# Patient Record
Sex: FEMALE | Employment: STUDENT | URBAN - METROPOLITAN AREA
[De-identification: names, ages, dates, MRNs, and addresses within clinical notes are randomized per-mention and may not be internally consistent; named-entity substitution may affect disease eponyms.]

---

## 2020-05-02 ENCOUNTER — VIRTUAL VISIT (OUTPATIENT)
Dept: FAMILY MEDICINE | Facility: OTHER | Age: 20
End: 2020-05-02

## 2020-05-03 NOTE — PROGRESS NOTES
"Date: 2020 14:45:00  Clinician: Maria Del Carmen Carter  Clinician NPI: [id number]  Patient: Jeremy Lucero  Patient : 2000  Patient Address: [address] [place] [id number]  Patient Phone: [phone]  Visit Protocol: URI  Patient Summary:  Jeremy is a 20 year old ( : 2000 ) female who initiated a Visit for COVID-19 (Coronavirus) evaluation and screening. When asked the question \"Please sign me up to receive news, health information and promotions from [facility].\", Jeremy responded \"No\".    Jeremy states her symptoms started gradually 3-4 days ago. After her symptoms started, they improved and then got worse again.   Her symptoms consist of myalgia, a sore throat, a cough, a headache, and malaise. She is experiencing mild difficulty breathing with activities but can speak normally in full sentences.   Symptom details     Cough: Jeremy coughs a few times an hour and her cough is more bothersome at night. Phlegm does not come into her throat when she coughs. She does not believe her cough is caused by post-nasal drip.     Sore throat: Jeremy reports having mild throat pain (1-3 on a 10 point pain scale), does not have exudate on her tonsils, and can swallow liquids. She is not sure if the lymph nodes in her neck are enlarged. A rash has not appeared on the skin since the sore throat started.     Headache: She states the headache is mild (1-3 on a 10 point pain scale).      Jeremy denies having fever, rhinitis, facial pain or pressure, nasal congestion, vomiting, nausea, teeth pain, ageusia, anosmia, diarrhea, ear pain, wheezing, and chills. She also denies taking antibiotic medication for the symptoms, having a sinus infection within the past year, and having recent facial or sinus surgery in the past 60 days.   Precipitating events  Within the past week, Jeremy has been exposed to someone with strep throat. She has not recently been exposed to someone with influenza. Jeremy has been in close contact with the " following high risk individuals: people with asthma, heart disease or diabetes and immunocompromised people.   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   Jeremy has not had a close contact with a laboratory-confirmed COVID-19 patient within 14 days of symptom onset. She also has not had a close contact with a suspected COVID-19 patient within 14 days of symptom onset.   Pertinent medical history  Jeremy does not get yeast infections when she takes antibiotics.   Jeremy does not need a return to work/school note.   Weight: 110 lbs   Jeremy does not smoke or use smokeless tobacco.   She denies pregnancy and denies breastfeeding. She has menstruated in the past month.   Weight: 110 lbs    MEDICATIONS: hydroxyzine HCl oral, venlafaxine oral, ALLERGIES: NKDA  Clinician Response:  Dear Jeremy,      Your symptoms show that you may have coronavirus (COVID-19). This illness can cause fever, cough and trouble breathing. Many people get a mild case and get better on their own. Some people can get very sick.   Will I be tested for COVID-19?  We would recommend you be tested for coronavirus. Here is how to get that scheduled:   Call [phone]. Tell them you were referred by [facility] to have a COVID-19 test. You will be scheduled at one of our [facility] testing locations (drive-up). Please have your [facility] visit information ready when you call including your visit ID number to verify you were referred.    How can I protect others in the meantime?  First, stay home and away from others (self-isolate) until:   You've had no fever---and no medicine that reduces fever---for 3 full days (72 hours). And...    Your other  symptoms have gotten better. For example, your cough or breathing has improved. And...    At least 7 days have passed since your symptoms started.   During this time:   Don't go to work, school or anywhere else.     Stay away from others in your home. No hugging, kissing or shaking hands.    Don't let anyone visit.    Cover your mouth and nose with a mask, tissue or wash cloth to avoid spreading germs.    Wash your hands and face often. Use soap and water.   How can I take care of myself?  1.Take Tylenol (acetaminophen) for fever or pain. If you have liver or kidney problems, ask your family doctor if it's okay to take Tylenol.   Adults can take either:    650 mg (two 325 mg pills) every 4 to 6 hours, or...    1,000 mg (two 500 mg pills) every 8 hours as needed.     Note: Don't take more than 3,000 mg in one day.   For children, check the Tylenol bottle for the right dose. The dose is based on the child's age or weight.  2.If you have other health problems (like cancer, heart failure, an organ transplant or severe kidney disease): Call your specialty clinic if you don't feel better in the next 2 days.  3.Know when to call 911: If your breathing is so bad that it keeps you from doing normal activities, call 911 or go to the emergency room. Tell them that you've been staying home and may have COVID-19.  4.Sign up for [employer]. We know it's scary to hear that you might have COVID-19. We want to track your symptoms to make sure you're okay over the next 2 weeks. Please look for an email from [employer]---this is a free, online program that we'll use to keep in touch. To sign up, follow the link in the email. Learn more at [email or web address].  Where can I get more information?  To learn more about COVID-19 and how to care for yourself at home, please visit the CDC website at https://www.cdc.gov/coronavirus/2019-ncov/about/steps-when-sick.html.  For more about your care at [facility], please  visit [email or web address]    Diagnosis: Cough  Diagnosis ICD: R05

## 2020-05-06 ENCOUNTER — OFFICE VISIT (OUTPATIENT)
Dept: URGENT CARE | Facility: URGENT CARE | Age: 20
End: 2020-05-06
Payer: COMMERCIAL

## 2020-05-06 DIAGNOSIS — Z53.9 ERRONEOUS ENCOUNTER--DISREGARD: ICD-10-CM

## 2020-05-06 DIAGNOSIS — Z20.822 SUSPECTED 2019 NOVEL CORONAVIRUS INFECTION: Primary | ICD-10-CM

## 2020-05-06 NOTE — PROGRESS NOTES
Patient given covid 19 education handout.  Laurel Antonio MA    This encounter was opened in error. Please disregard.

## 2021-03-04 ENCOUNTER — TRANSFERRED RECORDS (OUTPATIENT)
Dept: HEALTH INFORMATION MANAGEMENT | Facility: CLINIC | Age: 21
End: 2021-03-04

## 2021-03-07 ENCOUNTER — HEALTH MAINTENANCE LETTER (OUTPATIENT)
Age: 21
End: 2021-03-07

## 2021-03-10 ENCOUNTER — TRANSFERRED RECORDS (OUTPATIENT)
Dept: HEALTH INFORMATION MANAGEMENT | Facility: CLINIC | Age: 21
End: 2021-03-10

## 2021-03-15 ENCOUNTER — MEDICAL CORRESPONDENCE (OUTPATIENT)
Dept: HEALTH INFORMATION MANAGEMENT | Facility: CLINIC | Age: 21
End: 2021-03-15

## 2021-03-16 ENCOUNTER — TRANSCRIBE ORDERS (OUTPATIENT)
Dept: OTHER | Age: 21
End: 2021-03-16

## 2021-03-16 DIAGNOSIS — R19.7 DIARRHEA: Primary | ICD-10-CM

## 2021-04-06 NOTE — TELEPHONE ENCOUNTER
REFERRAL INFORMATION:    Referring Provider:  Dr Ofelia Espinal    Referring Clinic:  [facility]    Reason for Visit/Diagnosis: Video-mychart // IBS with worsening abdominal cramping, bloating and diarrhea      FUTURE VISIT INFORMATION:    Appointment Date: 5.5.21    Appointment Time: 1:40 PM     NOTES STATUS DETAILS   OFFICE NOTE from Referring Provider Received 3.4.21 Dr Espinal [place]   [facility]   HOSPITAL DISCHARGE SUMMARY/  ED VISITS N/A    OPERATIVE REPORT N/A    MEDICATION LIST Care Everywhere         ENDOSCOPY  N/A    COLONOSCOPY N/A    ERCP N/A    EUS N/A    STOOL TESTING Received [place] labs scanned to chart   PERTINENT LABS Received [place] labs scanned to chart   PATHOLOGY REPORTS (RELATED) N/A    IMAGING (CT, MRI, EGD, MRCP, Small Bowel Follow Through/SBT, MR/CT Enterography) N/A

## 2021-05-05 ENCOUNTER — VIRTUAL VISIT (OUTPATIENT)
Dept: GASTROENTEROLOGY | Facility: CLINIC | Age: 21
End: 2021-05-05
Attending: FAMILY MEDICINE
Payer: COMMERCIAL

## 2021-05-05 ENCOUNTER — PRE VISIT (OUTPATIENT)
Dept: GASTROENTEROLOGY | Facility: CLINIC | Age: 21
End: 2021-05-05

## 2021-05-05 VITALS — HEIGHT: 65 IN | BODY MASS INDEX: 22.49 KG/M2 | WEIGHT: 135 LBS

## 2021-05-05 DIAGNOSIS — K21.9 GASTROESOPHAGEAL REFLUX DISEASE, UNSPECIFIED WHETHER ESOPHAGITIS PRESENT: ICD-10-CM

## 2021-05-05 DIAGNOSIS — K58.0 IRRITABLE BOWEL SYNDROME WITH DIARRHEA: Primary | ICD-10-CM

## 2021-05-05 DIAGNOSIS — K52.9 CHRONIC DIARRHEA: ICD-10-CM

## 2021-05-05 PROCEDURE — 99204 OFFICE O/P NEW MOD 45 MIN: CPT | Mod: 95 | Performed by: INTERNAL MEDICINE

## 2021-05-05 RX ORDER — FLUOXETINE 10 MG/1
20 CAPSULE ORAL
COMMUNITY

## 2021-05-05 SDOH — HEALTH STABILITY: MENTAL HEALTH: HOW OFTEN DO YOU HAVE A DRINK CONTAINING ALCOHOL?: NOT ASKED

## 2021-05-05 SDOH — HEALTH STABILITY: MENTAL HEALTH: HOW OFTEN DO YOU HAVE 6 OR MORE DRINKS ON ONE OCCASION?: NOT ASKED

## 2021-05-05 SDOH — HEALTH STABILITY: MENTAL HEALTH: HOW MANY STANDARD DRINKS CONTAINING ALCOHOL DO YOU HAVE ON A TYPICAL DAY?: NOT ASKED

## 2021-05-05 ASSESSMENT — MIFFLIN-ST. JEOR: SCORE: 1378.24

## 2021-05-05 ASSESSMENT — PAIN SCALES - GENERAL: PAINLEVEL: MILD PAIN (3)

## 2021-05-05 NOTE — PROGRESS NOTES
GI CLINIC VISIT    CC/REFERRING MD:  Ofelia Espinal  REASON FOR CONSULTATION:   Ofelia Espinal for   Chief Complaint   Patient presents with     Consult     Virtual consult       ASSESSMENT/PLAN:  21-year-old female with history of irritable bowel syndrome as well as depression anxiety here for acute on chronic symptoms of IBS.    1. IBS with diarrhea-I think her acute flare in her symptoms are likely due to a postinfectious flare of her irritable bowel syndrome.  Her laboratory evaluation was reassuring.  Stool studies showed no evidence of infection.  I do note that she did have some leukocytes seen on the ova and parasite exam.  This may have been due to a resolving infection that was just not detected by her studies.  However, on the differential diagnosis does include inflammatory bowel disease and microscopic colitis.  Therefore I do recommend that we proceed with a ileocolonoscopy to further evaluate.  The terminal ileum will be evaluated and random colon biopsies will be obtained.  The differential diagnosis also includes celiac disease.  At the time of the colonoscopy we will perform an upper endoscopy with duodenal biopsies.    In the meantime I recommend that she start Citrucel fiber powder and titrate this up to 1 tablespoon twice daily.  She can continue to take her dicyclomine as this has been very effective for her.  She can take Imodium as needed.  She will continue to avoid food triggers (sorbitol and fructose-containing foods).    2.  GERD-she can take Pepcid 20 mg as needed.  We will perform an upper endoscopy to further evaluate for any esophagitis or other abnormalities.    3.  Depression/anxiety-recently started on Lamictal.  Lamictal does have known side effects of nausea, vomiting, dyspepsia, and looser stools.  If no other reason for the symptoms are found she will need to discuss with her prescribing provider about switching to a different medication.    RTC 3 months    Thank you for  this consultation.  It was a pleasure to participate in the care of this patient; please contact us with any further questions.      This note was created with voice recognition software, and while reviewed for accuracy, typos may remain.     Avery Torrez MD  Inflammatory Bowel Disease Program  Division of Gastroenterology, Hepatology and Nutrition  [facility]  Pager: [phone]      HPI:    Jeremy is a very pleasant 21-year-old female with a history of anxiety and depression on Lamictal as well as history of irritable bowel syndrome who is here today to discuss worsening symptoms of IBS.    She notes that she was diagnosed with IBS by general provider approximately 2 years ago.  Symptoms began during her freshman year during finals week.  Her symptoms historically have largely been driven by cramping abdominal discomfort and loose stools.  Prior to the spring these will relatively well managed by taking as needed dicyclomine.  She also has explored the low FODMAP diet and has found that foods containing sorbitol and fructose are triggers for her symptoms.    Unfortunately she develops what sounds like an acute gastroenteritis in March.  She had 24 hours of significant nausea vomiting and diarrhea.  The severe symptoms resolved but for the next 2 to 3 weeks if she still had ongoing nausea with intermittent vomiting as well as loose stools.  The symptoms were so significant and she actually lost her job.    Since then her symptoms have gradually improved but she still has some residual nausea, increased GERD symptoms, and 4-5 loose bowel movements per day.  She has some associated lower abdominal cramping that is relieved with having a bowel movement.    She saw her primary care provider.  CBC, LFTs, BMP, ESR, CRP were normal.  C. difficile was checked and negative.  Enteric stool panel was negative.  Ova and parasites were negative x3 though she did have some leukocytes seen.  TSH and free T4 was  normal.    She denies any weight loss.  She denies any nocturnal stools.  She denies any blood in her stool.    She does occasionally use Imodium.  Her dicyclomine definitely helps her when she remembers to use it.  She has tried Citrucel fiber tablets in the past but never powder.    She has no family history of Crohn's disease, ulcerative colitis, colon cancer, colon polyps, Lino's disease, esophagitis, stomach cancer, cysts stomach ulcers, liver disease, pancreas disease.    ROS:    No fevers or chills  No weight loss  No blurry vision, double vision or change in vision  No sore throat  No lymphadenopathy  No headache, paraesthesias, or weakness in a limb  No shortness of breath or wheezing  No chest pain or pressure  No arthralgias or myalgias  No rashes or skin changes  No odynophagia or dysphagia  No BRBPR, hematochezia, melena  No dysuria, frequency or urgency  No hot/cold intolerance or polyria  No anxiety or depression    PREVIOUS ENDOSCOPY:  None    PERTINENT RELEVANT IMAGING OR LABS:  Labs as above    ALLERGIES:   No Known Allergies    PERTINENT MEDICATIONS:  Lamictal.  Started on this in January.    PROBLEM LIST  There are no active problems to display for this patient.      PERTINENT PAST MEDICAL HISTORY:  Anxiety    Depression    IBS    IUD placement    PREVIOUS SURGERIES:  None    SOCIAL HISTORY:  Social History     Socioeconomic History     Marital status: Single     Spouse name: Not on file     Number of children: Not on file     Years of education: Not on file     Highest education level: Not on file   Occupational History     Not on file   Social Needs     Financial resource strain: Not on file     Food insecurity     Worry: Not on file     Inability: Not on file     Transportation needs     Medical: Not on file     Non-medical: Not on file   Tobacco Use     Smoking status: Current Every Day Smoker     Smokeless tobacco: Never Used   Substance and Sexual Activity     Alcohol use: Not Currently  "    Drug use: Yes     Frequency: 7.0 times per week     Types: Marijuana     Sexual activity: Not on file   Lifestyle     Physical activity     Days per week: Not on file     Minutes per session: Not on file     Stress: Not on file   Relationships     Social connections     Talks on phone: Not on file     Gets together: Not on file     Attends Islam service: Not on file     Active member of club or organization: Not on file     Attends meetings of clubs or organizations: Not on file     Relationship status: Not on file     Intimate partner violence     Fear of current or ex partner: Not on file     Emotionally abused: Not on file     Physically abused: Not on file     Forced sexual activity: Not on file   Other Topics Concern     Not on file   Social History Narrative     Not on file       FAMILY HISTORY:  Negative as outlined in HPI.  Past/family/social history reviewed and no changes    PHYSICAL EXAMINATION:  Constitutional: aaox3, cooperative, pleasant, not dyspneic/diaphoretic, no acute distress  Vitals reviewed: Ht 1.651 m (5' 5\")   Wt 61.2 kg (135 lb)   BMI 22.47 kg/m    Wt:   Wt Readings from Last 2 Encounters:   05/05/21 61.2 kg (135 lb)      Eyes: Sclera anicteric/injected  Respiratory: Unlabored breathing  Skin: no jaundice  Psych: Normal affect            "

## 2021-05-05 NOTE — PROGRESS NOTES
"Jeremy Lucero is a 21 year old female who is being evaluated via a billable video visit.      The patient has been notified of following:     \"This video visit will be conducted via a call between you and your physician/provider. We have found that certain health care needs can be provided without the need for an in-person physical exam.  This service lets us provide the care you need with a video conversation.  If a prescription is necessary we can send it directly to your pharmacy.  If lab work is needed we can place an order for that and you can then stop by our lab to have the test done at a later time.    If during the course of the call the physician/provider feels a video visit is not appropriate, you will not be charged for this service.\"     Patient confirmed that they are in [place] for today's visit Yes    Video-Visit Details  Type of service:  Video Visit    Video Start Time: 1:48 PM  Video End Time:  2:02 PM    Originating Location (pt. Location): Home    Distant Location (provider location):  [facility] [place]     Platform used: Ted            "

## 2021-05-05 NOTE — LETTER
"    5/5/2021         RE: Jeremy Lucero  [address] [facility] [address]        Dear Colleague,    Thank you for referring your patient, Jeremy Lucero, to the [facility] GASTROENTEROLOG[facility]IN[facility] [place]. Please see a copy of my visit note below.    Jeremy Lucero is a 21 year old female who is being evaluated via a billable video visit.      The patient has been notified of following:     \"This video visit will be conducted via a call between you and your physician/provider. We have found that certain health care needs can be provided without the need for an in-person physical exam.  This service lets us provide the care you need with a video conversation.  If a prescription is necessary we can send it directly to your pharmacy.  If lab work is needed we can place an order for that and you can then stop by our lab to have the test done at a later time.    If during the course of the call the physician/provider feels a video visit is not appropriate, you will not be charged for this service.\"     Patient confirmed that they are in [place] for today's visit Yes    Video-Visit Details  Type of service:  Video Visit    Video Start Time: 1:48 PM  Video End Time:  2:02 PM    Originating Location (pt. Location): Home    Distant Location (provider location):  [facility]STROENTEROLOG[facility] [place]     Platform used: [facility]              GI CLINIC VISIT    CC/REFERRING MD:  Ofelia Espinal  REASON FOR CONSULTATION:   Ofelia Espinal for   Chief Complaint   Patient presents with     Consult     Virtual consult       ASSESSMENT/PLAN:  21-year-old female with history of irritable bowel syndrome as well as depression anxiety here for acute on chronic symptoms of IBS.    1. IBS with diarrhea-I think her acute flare in her symptoms are likely due to a postinfectious flare of her irritable bowel syndrome.  Her laboratory evaluation was reassuring.  Stool studies showed no evidence of infection.  I do note " that she did have some leukocytes seen on the ova and parasite exam.  This may have been due to a resolving infection that was just not detected by her studies.  However, on the differential diagnosis does include inflammatory bowel disease and microscopic colitis.  Therefore I do recommend that we proceed with a ileocolonoscopy to further evaluate.  The terminal ileum will be evaluated and random colon biopsies will be obtained.  The differential diagnosis also includes celiac disease.  At the time of the colonoscopy we will perform an upper endoscopy with duodenal biopsies.    In the meantime I recommend that she start Citrucel fiber powder and titrate this up to 1 tablespoon twice daily.  She can continue to take her dicyclomine as this has been very effective for her.  She can take Imodium as needed.  She will continue to avoid food triggers (sorbitol and fructose-containing foods).    2.  GERD-she can take Pepcid 20 mg as needed.  We will perform an upper endoscopy to further evaluate for any esophagitis or other abnormalities.    3.  Depression/anxiety-recently started on Lamictal.  Lamictal does have known side effects of nausea, vomiting, dyspepsia, and looser stools.  If no other reason for the symptoms are found she will need to discuss with her prescribing provider about switching to a different medication.    RTC 3 months    Thank you for this consultation.  It was a pleasure to participate in the care of this patient; please contact us with any further questions.      This note was created with voice recognition software, and while reviewed for accuracy, typos may remain.     Avery Torrez MD  Inflammatory Bowel Disease Program  Division of Gastroenterology, Hepatology and Nutrition  [facility]  Pager: [phone]      HPI:    Jeremy is a very pleasant 21-year-old female with a history of anxiety and depression on Lamictal as well as history of irritable bowel syndrome who is here today to discuss  worsening symptoms of IBS.    She notes that she was diagnosed with IBS by general provider approximately 2 years ago.  Symptoms began during her freshman year during finals week.  Her symptoms historically have largely been driven by cramping abdominal discomfort and loose stools.  Prior to the spring these will relatively well managed by taking as needed dicyclomine.  She also has explored the low FODMAP diet and has found that foods containing sorbitol and fructose are triggers for her symptoms.    Unfortunately she develops what sounds like an acute gastroenteritis in March.  She had 24 hours of significant nausea vomiting and diarrhea.  The severe symptoms resolved but for the next 2 to 3 weeks if she still had ongoing nausea with intermittent vomiting as well as loose stools.  The symptoms were so significant and she actually lost her job.    Since then her symptoms have gradually improved but she still has some residual nausea, increased GERD symptoms, and 4-5 loose bowel movements per day.  She has some associated lower abdominal cramping that is relieved with having a bowel movement.    She saw her primary care provider.  CBC, LFTs, BMP, ESR, CRP were normal.  C. difficile was checked and negative.  Enteric stool panel was negative.  Ova and parasites were negative x3 though she did have some leukocytes seen.  TSH and free T4 was normal.    She denies any weight loss.  She denies any nocturnal stools.  She denies any blood in her stool.    She does occasionally use Imodium.  Her dicyclomine definitely helps her when she remembers to use it.  She has tried Citrucel fiber tablets in the past but never powder.    She has no family history of Crohn's disease, ulcerative colitis, colon cancer, colon polyps, Lino's disease, esophagitis, stomach cancer, cysts stomach ulcers, liver disease, pancreas disease.    ROS:    No fevers or chills  No weight loss  No blurry vision, double vision or change in vision  No  sore throat  No lymphadenopathy  No headache, paraesthesias, or weakness in a limb  No shortness of breath or wheezing  No chest pain or pressure  No arthralgias or myalgias  No rashes or skin changes  No odynophagia or dysphagia  No BRBPR, hematochezia, melena  No dysuria, frequency or urgency  No hot/cold intolerance or polyria  No anxiety or depression    PREVIOUS ENDOSCOPY:  None    PERTINENT RELEVANT IMAGING OR LABS:  Labs as above    ALLERGIES:   No Known Allergies    PERTINENT MEDICATIONS:  Lamictal.  Started on this in January.    PROBLEM LIST  There are no active problems to display for this patient.      PERTINENT PAST MEDICAL HISTORY:  Anxiety    Depression    IBS    IUD placement    PREVIOUS SURGERIES:  None    SOCIAL HISTORY:  Social History     Socioeconomic History     Marital status: Single     Spouse name: Not on file     Number of children: Not on file     Years of education: Not on file     Highest education level: Not on file   Occupational History     Not on file   Social Needs     Financial resource strain: Not on file     Food insecurity     Worry: Not on file     Inability: Not on file     Transportation needs     Medical: Not on file     Non-medical: Not on file   Tobacco Use     Smoking status: Current Every Day Smoker     Smokeless tobacco: Never Used   Substance and Sexual Activity     Alcohol use: Not Currently     Drug use: Yes     Frequency: 7.0 times per week     Types: Marijuana     Sexual activity: Not on file   Lifestyle     Physical activity     Days per week: Not on file     Minutes per session: Not on file     Stress: Not on file   Relationships     Social connections     Talks on phone: Not on file     Gets together: Not on file     Attends Taoism service: Not on file     Active member of club or organization: Not on file     Attends meetings of clubs or organizations: Not on file     Relationship status: Not on file     Intimate partner violence     Fear of current or ex  "partner: Not on file     Emotionally abused: Not on file     Physically abused: Not on file     Forced sexual activity: Not on file   Other Topics Concern     Not on file   Social History Narrative     Not on file       FAMILY HISTORY:  Negative as outlined in HPI.  Past/family/social history reviewed and no changes    PHYSICAL EXAMINATION:  Constitutional: aaox3, cooperative, pleasant, not dyspneic/diaphoretic, no acute distress  Vitals reviewed: Ht 1.651 m (5' 5\")   Wt 61.2 kg (135 lb)   BMI 22.47 kg/m    Wt:   Wt Readings from Last 2 Encounters:   05/05/21 61.2 kg (135 lb)      Eyes: Sclera anicteric/injected  Respiratory: Unlabored breathing  Skin: no jaundice  Psych: Normal affect      Again, thank you for allowing me to participate in the care of your patient.      Sincerely,    Avery Torrez MD    "

## 2021-05-05 NOTE — PATIENT INSTRUCTIONS
It was very nice to meet you during your virtual visit today.  I agree that your symptoms are likely due to a flare in your irritable bowel syndrome that was probably caused by a transient viral gastroenteritis a couple months ago.  This should improve with time and with supportive care but we will make sure there is nothing else going on.    My office will contact you about scheduling the upper endoscopy and colonoscopy.    In the meantime you can take Pepcid 20 mg as needed for heartburn.    You can also start Citrucel fiber powder.  Take 1/2 tablespoon daily in a glass of water for 7 days.  Then increase to 1 tablespoon daily in a glass of water for 7 days.  Then increase to 1 tablespoon twice daily in a glass of water thereafter.    Please drink 64 ounces of water daily.    You can take Imodium as needed for loose stools.    Please continue to avoid your known food triggers.    Follow-up in 3 months.

## 2021-05-07 ENCOUNTER — TELEPHONE (OUTPATIENT)
Dept: GASTROENTEROLOGY | Facility: CLINIC | Age: 21
End: 2021-05-07

## 2021-05-07 ENCOUNTER — PATIENT OUTREACH (OUTPATIENT)
Dept: GASTROENTEROLOGY | Facility: CLINIC | Age: 21
End: 2021-05-07

## 2021-05-07 ENCOUNTER — HOSPITAL ENCOUNTER (OUTPATIENT)
Facility: AMBULATORY SURGERY CENTER | Age: 21
End: 2021-05-07
Attending: INTERNAL MEDICINE
Payer: COMMERCIAL

## 2021-05-07 DIAGNOSIS — Z11.59 ENCOUNTER FOR SCREENING FOR OTHER VIRAL DISEASES: ICD-10-CM

## 2021-05-07 NOTE — TELEPHONE ENCOUNTER
Screening Questions  1. What insurance is in the chart? [employer]    2.  Ordering/Referring Provider: Avery Torrez,    3. BMI 22.5    4. Are you on daily home oxygen? no    5. Do you have a history of difficult airway? no    6. Have you had a heart, lung, or liver transplant? no    7. Are you currently on dialysis? no    8. Have you had a stroke or Transient ischemic atttack (TIA) within 6 months? no    9. In the past 6 months, have you had any heart related issues including cardiomyopathy or heart attack?         If yes, did it require cardiac stenting or other implantable device? no    10. Do you have any implantable devices in your body (pacemaker, defib, LVAD)? no    11. Do you take nitroglycerin? If yes, how often? no    12. Are you currently taking any blood thinners?no    13. Are you a diabetic? no    14. (Females) Are you currently pregnant? no  If yes, how many weeks?    15. Have you had a procedure in the past that was difficult to tolerate with conscious sedation? Any allergies to Fentanyl or Versed no    16. Are you taking any scheduled prescription narcotics more than once daily? no    17. Do you have any chemical dependencies such as alcohol, street drugs, or methadone? no    18. Do you have any history of post-traumatic stress syndrome or mental health issues? no    19. Do you transfer independently? yes    20.  Do you have any issues with constipation?     21. Preferred Pharmacy for Pre Prescription on chart    Scheduling Details    Procedure Scheduled: colonoscopy   Provider/Surgeon: lopez  Date of Procedure: 5/17/2021  [place] [facility] [place]  Caller (Please ask for phone number if not scheduled by patient): Jeremy Lucero      Sedation Type: MAC  Conscious Sedation- Needs  for 6 hours after the procedure  MAC/General-Needs  for 24 hours after procedure    Pre-op Required at [facility] and OR for  MAC sedation:   (if yes advise patient they will need a pre-op prior to procedure)      Is  patient on blood thinners? -no (If yes- inform patient to follow up with PCP or provider for follow up instructions)     Informed patient they will need an adult  yes  Cannot take any type of public or medical transportation alone    Informed Patient of COVID Test Requirement yes and scheduled    Confirmed Nurse will call to complete assessment yes    Additional comments: no

## 2021-05-07 NOTE — CONFIDENTIAL NOTE
Patient  returned call and follow up scheduled   Transferred to endoscopy to schedule endoscopic procedures

## 2021-05-07 NOTE — CONFIDENTIAL NOTE
Contacted  patient post clinic appt   Left message to call if she has questions  Also sent a my chart message and message to team to schedule follow up

## 2021-05-07 NOTE — TELEPHONE ENCOUNTER
Writer reviewed pre-assessment questions with patient prior to upcoming colonoscopy and EGD on 5.17.2021.  COVID test scheduled for 5.13.2021.    Reviewed miralax/magnesium citrate prep instructions with patient.  Noting no nuts, seeds, or popcorn 7 days prior to procedure.     Patient verbalized understanding.  No further questions or concerns.    Francy Sexton RN

## 2021-05-13 DIAGNOSIS — Z11.59 ENCOUNTER FOR SCREENING FOR OTHER VIRAL DISEASES: ICD-10-CM

## 2021-05-13 LAB
LABORATORY COMMENT REPORT: NORMAL
SARS-COV-2 RNA RESP QL NAA+PROBE: NEGATIVE
SARS-COV-2 RNA RESP QL NAA+PROBE: NORMAL
SPECIMEN SOURCE: NORMAL
SPECIMEN SOURCE: NORMAL

## 2021-05-13 PROCEDURE — U0005 INFEC AGEN DETEC AMPLI PROBE: HCPCS | Mod: 90 | Performed by: PATHOLOGY

## 2021-05-13 PROCEDURE — U0003 INFECTIOUS AGENT DETECTION BY NUCLEIC ACID (DNA OR RNA); SEVERE ACUTE RESPIRATORY SYNDROME CORONAVIRUS 2 (SARS-COV-2) (CORONAVIRUS DISEASE [COVID-19]), AMPLIFIED PROBE TECHNIQUE, MAKING USE OF HIGH THROUGHPUT TECHNOLOGIES AS DESCRIBED BY CMS-2020-01-R: HCPCS | Mod: 90 | Performed by: PATHOLOGY

## 2021-05-13 PROCEDURE — 99000 SPECIMEN HANDLING OFFICE-LAB: CPT | Performed by: PATHOLOGY

## 2021-05-14 ENCOUNTER — TELEPHONE (OUTPATIENT)
Dept: GASTROENTEROLOGY | Facility: CLINIC | Age: 21
End: 2021-05-14

## 2021-05-14 NOTE — TELEPHONE ENCOUNTER
Caller: Jeremy Lucero    Procedure: EGD and colon    Date of Procedure Cancelled: 5/17/2021    Ordering Provider:Shan    Reason for cancel:  pt would like to do this procedure closer to [facility]    Rescheduled: not at this time.      If rescheduled    Date:    Location:    Note any change or update to original order/sedation:

## 2021-07-20 ENCOUNTER — TELEPHONE (OUTPATIENT)
Dept: GASTROENTEROLOGY | Facility: OUTPATIENT CENTER | Age: 21
End: 2021-07-20

## 2021-07-20 ENCOUNTER — HOSPITAL ENCOUNTER (OUTPATIENT)
Facility: AMBULATORY SURGERY CENTER | Age: 21
End: 2021-07-20
Attending: INTERNAL MEDICINE
Payer: COMMERCIAL

## 2021-07-20 DIAGNOSIS — Z11.59 ENCOUNTER FOR SCREENING FOR OTHER VIRAL DISEASES: ICD-10-CM

## 2021-07-20 NOTE — TELEPHONE ENCOUNTER
Screening Questions  1. Are you active on mychart? yes    2. What insurance is in the chart? [employer] [place]    2.  Ordering/Referring Provider: Avery Torrez    3. BMI 22.46    4. Are you on daily home oxygen? no    5. Do you have a history of difficult airway? Yes, vocal cord dysfunction stress related    6. Have you had a heart, lung, or liver transplant? no    7. Are you currently on dialysis? no    8. Have you had a stroke or Transient ischemic atttack (TIA) within 6 months? no    9. In the past 6 months, have you had any heart related issues including cardiomyopathy or heart attack?         If yes, did it require cardiac stenting or other implantable device?no    10. Do you have any implantable devices in your body (pacemaker, defib, LVAD)? no    11. Do you take nitroglycerin? If yes, how often? no    12. Are you currently taking any blood thinners?no    13. Are you a diabetic? no    14. (Females) Are you currently pregnant? no  If yes, how many weeks?    15. Have you had a procedure in the past that was difficult to tolerate with conscious sedation? Any allergies to Fentanyl or Versed no    16. Are you taking any scheduled prescription narcotics more than once daily? no    17. Do you have any chemical dependencies such as alcohol, street drugs, or methadone? no    18. Do you have any history of post-traumatic stress syndrome or mental health issues? Bipolar, gen anxiety, adhd    19. Do you transfer independently? yes    20.  Do you have any issues with constipation? no    21. Preferred Pharmacy for Pre Prescription [facility] [address] [place]    Scheduling Details    Colonoscopy Prep Sent?: miralax  Procedure Scheduled: egd/colonoscopy  Provider/Surgeon: lopez  Date of Procedure: 08/23/21  Location: [facility] [place]  Caller (Please ask for phone number if not scheduled by patient): sherry lowe      Sedation Type: mac  Conscious Sedation- Needs  for 6 hours after the procedure  MAC/General-Needs   for 24 hours after procedure    Pre-op Required at [facility] [place], [place] and OR for MAC sedation:   (if yes advise patient they will need a pre-op prior to procedure)      Is patient on blood thinners? -no (If yes- inform patient to follow up with PCP or provider for follow up instructions)     Informed patient they will need an adult  yes  Cannot take any type of public or medical transportation alone    Informed Patient of COVID Test Requirement yes    Confirmed Nurse will call to complete assessment yes    Additional comments:

## 2021-08-16 ENCOUNTER — TELEPHONE (OUTPATIENT)
Dept: GASTROENTEROLOGY | Facility: CLINIC | Age: 21
End: 2021-08-16

## 2021-08-16 NOTE — TELEPHONE ENCOUNTER
Attempted to contact patient regarding upcoming colonoscopy/EGD scheduled on 8/23/21.     Per patient she would like to cancel procedures due to scheduling conflict as she is going to be in [place] this weekend. Patient will see if they can have it done in [place] instead.     Will send to endoscopy scheduling to facilitate cancellation of procedures per request.    Josey Steve RN

## 2021-08-16 NOTE — TELEPHONE ENCOUNTER
Caller: Message from RN    Procedure: EGD/Colon    Date of Procedure Cancelled: 8/23    Ordering Provider:Avery Torrez MD    Reason for cancel:     ----- Message from Josey Steve RN sent at 8/16/2021 11:33 AM CDT -----  Regarding: cancel  Patient scheduled for colonoscopy/ EGD on 8/23/21    Per patient she would like to cancel procedures due to scheduling conflict as she is going to be in [place] this weekend. Patient will see if they can have it done in [place] instead.     Can someone help facilitate this cancellation of procedures per patient's request?    Thanks!  Komal       Rescheduled: n     If rescheduled:    Date:    Location:    Note any change or update to original order/sedation:

## 2021-08-18 ENCOUNTER — NURSE TRIAGE (OUTPATIENT)
Dept: NURSING | Facility: CLINIC | Age: 21
End: 2021-08-18

## 2021-08-19 NOTE — TELEPHONE ENCOUNTER
Patient wanting to schedule a lab that was recommended by psychiatry at [facility].  Advised to establish care with PCP.  Patient verbalized understanding.    Va Ceja RN  [facility]

## 2021-10-10 ENCOUNTER — HEALTH MAINTENANCE LETTER (OUTPATIENT)
Age: 21
End: 2021-10-10

## 2022-03-16 NOTE — NURSING NOTE
"Chief Complaint   Patient presents with     Consult     Virtual consult       Vitals:    05/05/21 1320   Weight: 61.2 kg (135 lb)   Height: 1.651 m (5' 5\")       Body mass index is 22.47 kg/m .      MICHELLE LaneT                      "
(M6) obeys commands

## 2022-03-27 ENCOUNTER — HEALTH MAINTENANCE LETTER (OUTPATIENT)
Age: 22
End: 2022-03-27

## 2022-09-24 ENCOUNTER — HEALTH MAINTENANCE LETTER (OUTPATIENT)
Age: 22
End: 2022-09-24

## 2023-05-08 ENCOUNTER — HEALTH MAINTENANCE LETTER (OUTPATIENT)
Age: 23
End: 2023-05-08